# Patient Record
Sex: MALE | Race: WHITE | NOT HISPANIC OR LATINO | URBAN - METROPOLITAN AREA
[De-identification: names, ages, dates, MRNs, and addresses within clinical notes are randomized per-mention and may not be internally consistent; named-entity substitution may affect disease eponyms.]

---

## 2023-10-06 ENCOUNTER — APPOINTMENT (OUTPATIENT)
Dept: RADIOLOGY | Facility: HOSPITAL | Age: 28
End: 2023-10-06

## 2023-10-06 ENCOUNTER — HOSPITAL ENCOUNTER (EMERGENCY)
Facility: HOSPITAL | Age: 28
Discharge: HOME | End: 2023-10-06
Attending: STUDENT IN AN ORGANIZED HEALTH CARE EDUCATION/TRAINING PROGRAM

## 2023-10-06 VITALS
OXYGEN SATURATION: 99 % | DIASTOLIC BLOOD PRESSURE: 95 MMHG | WEIGHT: 230 LBS | SYSTOLIC BLOOD PRESSURE: 169 MMHG | HEART RATE: 114 BPM | TEMPERATURE: 97.5 F | HEIGHT: 70 IN | RESPIRATION RATE: 18 BRPM | BODY MASS INDEX: 32.93 KG/M2

## 2023-10-06 DIAGNOSIS — S69.90XA INJURY OF HAND, UNSPECIFIED LATERALITY, INITIAL ENCOUNTER: ICD-10-CM

## 2023-10-06 DIAGNOSIS — T14.8XXA PUNCTURE WOUND: Primary | ICD-10-CM

## 2023-10-06 PROCEDURE — 99283 EMERGENCY DEPT VISIT LOW MDM: CPT | Mod: 25 | Performed by: STUDENT IN AN ORGANIZED HEALTH CARE EDUCATION/TRAINING PROGRAM

## 2023-10-06 PROCEDURE — 73130 X-RAY EXAM OF HAND: CPT | Mod: LT,FY

## 2023-10-06 RX ORDER — CEPHALEXIN 500 MG/1
500 CAPSULE ORAL 4 TIMES DAILY
Qty: 40 CAPSULE | Refills: 0 | Status: SHIPPED | OUTPATIENT
Start: 2023-10-06 | End: 2023-10-16

## 2023-10-06 ASSESSMENT — PAIN SCALES - GENERAL: PAINLEVEL_OUTOF10: 7

## 2023-10-06 ASSESSMENT — COLUMBIA-SUICIDE SEVERITY RATING SCALE - C-SSRS
2. HAVE YOU ACTUALLY HAD ANY THOUGHTS OF KILLING YOURSELF?: NO
6. HAVE YOU EVER DONE ANYTHING, STARTED TO DO ANYTHING, OR PREPARED TO DO ANYTHING TO END YOUR LIFE?: NO
1. IN THE PAST MONTH, HAVE YOU WISHED YOU WERE DEAD OR WISHED YOU COULD GO TO SLEEP AND NOT WAKE UP?: NO

## 2023-10-06 ASSESSMENT — PAIN DESCRIPTION - FREQUENCY: FREQUENCY: CONSTANT/CONTINUOUS

## 2023-10-06 ASSESSMENT — PAIN DESCRIPTION - DESCRIPTORS: DESCRIPTORS: PRESSURE

## 2023-10-06 ASSESSMENT — LIFESTYLE VARIABLES
EVER HAD A DRINK FIRST THING IN THE MORNING TO STEADY YOUR NERVES TO GET RID OF A HANGOVER: NO
EVER FELT BAD OR GUILTY ABOUT YOUR DRINKING: NO
HAVE PEOPLE ANNOYED YOU BY CRITICIZING YOUR DRINKING: NO
HAVE YOU EVER FELT YOU SHOULD CUT DOWN ON YOUR DRINKING: NO

## 2023-10-06 ASSESSMENT — PAIN DESCRIPTION - LOCATION: LOCATION: HAND

## 2023-10-06 ASSESSMENT — PAIN DESCRIPTION - ORIENTATION: ORIENTATION: LEFT

## 2023-10-06 ASSESSMENT — PAIN - FUNCTIONAL ASSESSMENT: PAIN_FUNCTIONAL_ASSESSMENT: 0-10

## 2023-10-06 ASSESSMENT — PAIN DESCRIPTION - ONSET: ONSET: ONGOING

## 2023-10-06 ASSESSMENT — PAIN DESCRIPTION - PROGRESSION: CLINICAL_PROGRESSION: NOT CHANGED

## 2023-10-06 NOTE — Clinical Note
Rodri Gooden was seen and treated in our emergency department on 10/6/2023.  He may return to work on 10/09/2023.       If you have any questions or concerns, please don't hesitate to call.      Hussain Ayala MD

## 2023-10-06 NOTE — ED PROVIDER NOTES
Chief Complaint   Patient presents with   • Hand Injury     Pt presents to the emergency department with a hand injury. Pt put a screwdriver through his hand. Pt able to bend hand.            Chief Complaint: Puncture wound to the left hand.    History of Present Illness (HPI):  The patient is a 28-year-old male with no significant past medical history. He presents to the ED with complaints of a puncture wound to his left hand. The injury occurred while the patient was working with a screwdriver, which punctured the palmar aspect of the thenar region of his hand. Despite the injury, the patient reports full range of motion and pain with movement. He denies any numbness and there is no active bleeding at the site of the wound.    Past Medical History:    No significant past medical history.  Physical Exam:    The patient has a puncture wound on the palmar aspect of the thenar region of the left hand.  Full range of motion is maintained in the hand.  No signs of numbness or weakness are observed.  There is no active bleeding.  Assessment and Plan:  Given the patient's presentation with a puncture wound to the hand, the following steps will be taken:    Redosing of Tdap (Tetanus, Diphtheria, Pertussis) vaccine as a preventive measure for potential infection.  Extensive irrigation of the wound to reduce the risk of infection.  Reevaluation of the injury site for any signs of foreign body retention or deeper injury.  An X-ray will be obtained to assess for the presence of any foreign bodies or fractures.  After evaluation, anticipate discharge with appropriate wound care instructions and a prescription for prophylactic antibiotics if deemed necessary.  Follow-up:  The patient will be provided with wound care instructions and advised to follow up with his primary care provider if any signs of infection or complications develop.        ED Course as of 10/06/23 1206   Fri Oct 06, 2023   1153 No evidence of foreign body on  x-ray, patient has been irrigated copiously, [AD]      ED Course User Index  [AD] Hussain Ayala MD         Diagnoses as of 10/06/23 1206   Puncture wound   Injury of hand, unspecified laterality, initial encounter           Patient History   History reviewed. No pertinent past medical history.  Past Surgical History:   Procedure Laterality Date   • TONSILLECTOMY       No family history on file.  Social History     Tobacco Use   • Smoking status: Every Day     Packs/day: .5     Types: Cigarettes   • Smokeless tobacco: Never   Vaping Use   • Vaping Use: Never used   Substance Use Topics   • Alcohol use: Never   • Drug use: Never       Physical Exam   ED Triage Vitals [10/06/23 1116]   Temp Heart Rate Resp BP   36.4 °C (97.5 °F) (!) 114 18 (!) 169/95      SpO2 Temp src Heart Rate Source Patient Position   99 % -- -- --      BP Location FiO2 (%)     -- --                 XR hand left 3+ views    (Results Pending)   X-ray without foreign body.  Wound thoroughly irrigated and cleansed, neurologically neurovascularly intact.  Dressing placed.  Antibiotics initiated.  Given appropriate follow-up.  Released in good condition.  Can take anti-inflammatory medicine for pain control.    Luther Patrick PA-C                      Procedure  Procedures     Luther Patrick PA-C  10/06/23 1206

## 2025-06-30 ENCOUNTER — HOSPITAL ENCOUNTER (EMERGENCY)
Facility: HOSPITAL | Age: 30
Discharge: HOME | End: 2025-06-30
Payer: COMMERCIAL

## 2025-06-30 ENCOUNTER — APPOINTMENT (OUTPATIENT)
Dept: RADIOLOGY | Facility: HOSPITAL | Age: 30
End: 2025-06-30
Payer: COMMERCIAL

## 2025-06-30 ENCOUNTER — APPOINTMENT (OUTPATIENT)
Dept: URGENT CARE | Age: 30
End: 2025-06-30

## 2025-06-30 VITALS
SYSTOLIC BLOOD PRESSURE: 177 MMHG | BODY MASS INDEX: 31.53 KG/M2 | RESPIRATION RATE: 16 BRPM | WEIGHT: 220.24 LBS | HEIGHT: 70 IN | DIASTOLIC BLOOD PRESSURE: 120 MMHG | TEMPERATURE: 98.2 F | OXYGEN SATURATION: 96 % | HEART RATE: 83 BPM

## 2025-06-30 DIAGNOSIS — S30.1XXA ABDOMINAL WALL HEMATOMA, INITIAL ENCOUNTER: Primary | ICD-10-CM

## 2025-06-30 DIAGNOSIS — I10 HYPERTENSION, UNSPECIFIED TYPE: ICD-10-CM

## 2025-06-30 LAB
ALBUMIN SERPL BCP-MCNC: 5.2 G/DL (ref 3.4–5)
ALP SERPL-CCNC: 55 U/L (ref 33–120)
ALT SERPL W P-5'-P-CCNC: 56 U/L (ref 10–52)
ANION GAP SERPL CALCULATED.3IONS-SCNC: 13 MMOL/L (ref 10–20)
AST SERPL W P-5'-P-CCNC: 31 U/L (ref 9–39)
BASOPHILS # BLD AUTO: 0.03 X10*3/UL (ref 0–0.1)
BASOPHILS NFR BLD AUTO: 0.3 %
BILIRUB SERPL-MCNC: 0.8 MG/DL (ref 0–1.2)
BUN SERPL-MCNC: 12 MG/DL (ref 6–23)
CALCIUM SERPL-MCNC: 10.1 MG/DL (ref 8.6–10.3)
CHLORIDE SERPL-SCNC: 104 MMOL/L (ref 98–107)
CO2 SERPL-SCNC: 26 MMOL/L (ref 21–32)
CREAT SERPL-MCNC: 0.97 MG/DL (ref 0.5–1.3)
EGFRCR SERPLBLD CKD-EPI 2021: >90 ML/MIN/1.73M*2
EOSINOPHIL # BLD AUTO: 0.01 X10*3/UL (ref 0–0.7)
EOSINOPHIL NFR BLD AUTO: 0.1 %
ERYTHROCYTE [DISTWIDTH] IN BLOOD BY AUTOMATED COUNT: 12.1 % (ref 11.5–14.5)
GLUCOSE SERPL-MCNC: 95 MG/DL (ref 74–99)
HCT VFR BLD AUTO: 47.6 % (ref 41–52)
HGB BLD-MCNC: 16.7 G/DL (ref 13.5–17.5)
IMM GRANULOCYTES # BLD AUTO: 0.03 X10*3/UL (ref 0–0.7)
IMM GRANULOCYTES NFR BLD AUTO: 0.3 % (ref 0–0.9)
LYMPHOCYTES # BLD AUTO: 2.31 X10*3/UL (ref 1.2–4.8)
LYMPHOCYTES NFR BLD AUTO: 25.2 %
MCH RBC QN AUTO: 31.6 PG (ref 26–34)
MCHC RBC AUTO-ENTMCNC: 35.1 G/DL (ref 32–36)
MCV RBC AUTO: 90 FL (ref 80–100)
MONOCYTES # BLD AUTO: 0.49 X10*3/UL (ref 0.1–1)
MONOCYTES NFR BLD AUTO: 5.3 %
NEUTROPHILS # BLD AUTO: 6.3 X10*3/UL (ref 1.2–7.7)
NEUTROPHILS NFR BLD AUTO: 68.8 %
NRBC BLD-RTO: 0 /100 WBCS (ref 0–0)
PLATELET # BLD AUTO: 396 X10*3/UL (ref 150–450)
POTASSIUM SERPL-SCNC: 4 MMOL/L (ref 3.5–5.3)
PROT SERPL-MCNC: 8.3 G/DL (ref 6.4–8.2)
RBC # BLD AUTO: 5.29 X10*6/UL (ref 4.5–5.9)
SODIUM SERPL-SCNC: 139 MMOL/L (ref 136–145)
WBC # BLD AUTO: 9.2 X10*3/UL (ref 4.4–11.3)

## 2025-06-30 PROCEDURE — 2500000005 HC RX 250 GENERAL PHARMACY W/O HCPCS: Performed by: PHYSICIAN ASSISTANT

## 2025-06-30 PROCEDURE — 2500000001 HC RX 250 WO HCPCS SELF ADMINISTERED DRUGS (ALT 637 FOR MEDICARE OP): Performed by: PHYSICIAN ASSISTANT

## 2025-06-30 PROCEDURE — 85025 COMPLETE CBC W/AUTO DIFF WBC: CPT | Performed by: PHYSICIAN ASSISTANT

## 2025-06-30 PROCEDURE — 99285 EMERGENCY DEPT VISIT HI MDM: CPT | Mod: 25

## 2025-06-30 PROCEDURE — 2500000004 HC RX 250 GENERAL PHARMACY W/ HCPCS (ALT 636 FOR OP/ED): Performed by: PHYSICIAN ASSISTANT

## 2025-06-30 PROCEDURE — 36415 COLL VENOUS BLD VENIPUNCTURE: CPT | Performed by: PHYSICIAN ASSISTANT

## 2025-06-30 PROCEDURE — 2550000001 HC RX 255 CONTRASTS: Performed by: PHYSICIAN ASSISTANT

## 2025-06-30 PROCEDURE — 74177 CT ABD & PELVIS W/CONTRAST: CPT

## 2025-06-30 PROCEDURE — 96360 HYDRATION IV INFUSION INIT: CPT | Mod: 59

## 2025-06-30 PROCEDURE — 74177 CT ABD & PELVIS W/CONTRAST: CPT | Performed by: RADIOLOGY

## 2025-06-30 PROCEDURE — 84075 ASSAY ALKALINE PHOSPHATASE: CPT | Performed by: PHYSICIAN ASSISTANT

## 2025-06-30 RX ORDER — ACETAMINOPHEN 325 MG/1
975 TABLET ORAL ONCE
Status: COMPLETED | OUTPATIENT
Start: 2025-06-30 | End: 2025-06-30

## 2025-06-30 RX ORDER — LIDOCAINE 560 MG/1
1 PATCH PERCUTANEOUS; TOPICAL; TRANSDERMAL ONCE
Status: DISCONTINUED | OUTPATIENT
Start: 2025-06-30 | End: 2025-06-30 | Stop reason: HOSPADM

## 2025-06-30 RX ADMIN — ACETAMINOPHEN 975 MG: 325 TABLET ORAL at 12:01

## 2025-06-30 RX ADMIN — SODIUM CHLORIDE 1000 ML: 900 INJECTION, SOLUTION INTRAVENOUS at 12:09

## 2025-06-30 RX ADMIN — IOHEXOL 75 ML: 350 INJECTION, SOLUTION INTRAVENOUS at 12:52

## 2025-06-30 RX ADMIN — LIDOCAINE 4% 1 PATCH: 40 PATCH TOPICAL at 12:01

## 2025-06-30 ASSESSMENT — PAIN - FUNCTIONAL ASSESSMENT
PAIN_FUNCTIONAL_ASSESSMENT: 0-10
PAIN_FUNCTIONAL_ASSESSMENT: 0-10

## 2025-06-30 ASSESSMENT — PAIN SCALES - GENERAL
PAINLEVEL_OUTOF10: 4
PAINLEVEL_OUTOF10: 10 - WORST POSSIBLE PAIN

## 2025-06-30 NOTE — LETTER
June 30, 2025    Patient: Rodri Gooden   YOB: 1995   Date of Visit: 6/30/2025       To Whom It May Concern:    Rodri Gooden was seen and treated in our emergency department on 6/30/2025. He may be excused for work 6-30-25 to 7-01-25.    If you have any questions or concerns, please don't hesitate to call.

## 2025-06-30 NOTE — ED PROVIDER NOTES
HPI   Chief Complaint   Patient presents with    Abdominal Injury     Patient stated that on the 21st you accidentally hit stomach with his , now he is having pain where he hit himself with the .        HPI  29-year-old male here for evaluation of right lower abdominal pain.  Says that he has a large commercial lawnmower that he stands on a roller behind it and it pulls him.  The mower was moving and it clipped a cement piece on something and came to a quick stop and the handle struck him in the right lower quadrant.      Patient History   Medical History[1]  Surgical History[2]  Family History[3]  Social History[4]    Physical Exam   ED Triage Vitals [06/30/25 1138]   Temperature Heart Rate Respirations BP   36.8 °C (98.2 °F) 83 16 (!) 177/120      Pulse Ox Temp Source Heart Rate Source Patient Position   96 % Oral Monitor Sitting      BP Location FiO2 (%)     Right arm --       Physical Exam  PHYSICAL EXAMINATION    GENERAL APPEARANCE: Awake and alert.     VITAL SIGNS: As per the nurses' triage record.     HEENT: Normocephalic, atraumatic. Extraocular muscles are intact. Pupils equal round and reactive to light. Conjunctiva are pink. Negative scleral icterus. Mucous membranes are moist. Tongue in the midline. Pharynx was without erythema or exudates, uvula midline    NECK: Soft Nontender and supple, full gross ROM, no meningeal signs.    CHEST: Nontender to palpation. Clear to auscultation bilaterally. No rales, rhonchi, or wheezing.     HEART: S1, S2. Regular rate and rhythm. No murmurs, gallops or rubs.  Strong and equal pulses in the extremities.     ABDOMEN: Soft, palpable hematoma right lower quadrant, approximately 4 x 5 cm.  No redness or skin breakdown, nondistended, positive bowel sounds, no palpable masses.    MUSCULOSKELETAL: The calves are nontender to palpation. Full gross active range of motion. Ambulating on own with no acute difficulties     NEUROLOGICAL: Awake, alert and  oriented x 3. Power intact in the upper and lower extremities. Sensation is intact to light touch in the upper and lower extremities.     IMMUNOLOGICAL: No lymphatic streaking noted     DERM: No petechiae, rashes, or ecchymoses.    ED Course & MDM   Diagnoses as of 06/30/25 1336   Abdominal wall hematoma, initial encounter   Hypertension, unspecified type                 No data recorded     [place] Coma Scale Score: 15 (06/30/25 1203 : Ana Maria Matamoros RN)                           Medical Decision Making  Parts of this chart have been completed using voice recognition software. Please excuse any errors of transcription.  My thought process and reason for plan has been formulated from the time that I saw the patient until the time of disposition and is not specific to one specific moment during their visit and furthermore my MDM encompasses this entire chart and not only this text box.      HPI: Detailed above.    Exam: A medically appropriate exam performed, outlined above, given the known history and presentation.    History Limited by: Nothing    History obtained from: The patient    External/internal records reviewed: No external record reviewed    Social Determinants of Health considered during this visit: Lives at home    Chronic conditions impacting care: Denies    Medications given during visit:  Medications   lidocaine 4 % patch 1 patch (1 patch transdermal Medication Applied 6/30/25 1201)   sodium chloride 0.9 % bolus 1,000 mL (0 mL intravenous Stopped 6/30/25 1251)   acetaminophen (Tylenol) tablet 975 mg (975 mg oral Given 6/30/25 1201)   iohexol (OMNIPaque) 350 mg iodine/mL solution 75 mL (75 mL intravenous Given 6/30/25 1252)        Diagnostic/tests  Labs Reviewed   COMPREHENSIVE METABOLIC PANEL - Abnormal       Result Value    Glucose 95      Sodium 139      Potassium 4.0      Chloride 104      Bicarbonate 26      Anion Gap 13      Urea Nitrogen 12      Creatinine 0.97      eGFR >90      Calcium 10.1       Albumin 5.2 (*)     Alkaline Phosphatase 55      Total Protein 8.3 (*)     AST 31      Bilirubin, Total 0.8      ALT 56 (*)    CBC WITH AUTO DIFFERENTIAL    WBC 9.2      nRBC 0.0      RBC 5.29      Hemoglobin 16.7      Hematocrit 47.6      MCV 90      MCH 31.6      MCHC 35.1      RDW 12.1      Platelets 396      Neutrophils % 68.8      Immature Granulocytes %, Automated 0.3      Lymphocytes % 25.2      Monocytes % 5.3      Eosinophils % 0.1      Basophils % 0.3      Neutrophils Absolute 6.30      Immature Granulocytes Absolute, Automated 0.03      Lymphocytes Absolute 2.31      Monocytes Absolute 0.49      Eosinophils Absolute 0.01      Basophils Absolute 0.03        CT abdomen pelvis w IV contrast   Final Result   1. No evidence of bowel obstruction, free intraperitoneal air or   abnormal intra-abdominal fluid collection.        MACRO:   None        Signed by: Eliot Mohan 6/30/2025 1:18 PM   Dictation workstation:   [id number]               Considerations/further MDM:  I estimate there is low risk for acute abdomen.  I have considered the following: acute appendicitis, bowel obstruction, cholecystitis, diverticulitis, incarcerated hernia, mesenteric ischemia, pancreatitis, acute liver failure, renal failure, UTI/Pyelonephritis to an extent that requires admission and IV abx, perforated bowel, or ulcers.    Thus I consider the discharge disposition reasonable. Also, there is no evidence or peritonitis, sepsis, or toxicity. We have discussed the diagnosis and risks, and we agree with discharging home to follow-up with appropriate physician as directed. We also discussed returning to the Emergency Department immediately if new or worsening symptoms occur. We have discussed the symptoms which are most concerning (e.g., bloody stool, fever, changing or worsening pain, nausea, vomiting) that necessitate immediate return. Pt symptoms have been well controlled here with medications and the patient is lower risk for  acute/surgical abdomen and is safe for discharge with appropriate outpatient follow up.  The patient has verbalized understanding to return to ER without delay for new or worsening pains or for any other symptoms or concerns.    Hypertension initially appreciated improved.    Recommend ice and conservative management, vitals are reviewed, hemoglobin stable.      Procedure  Procedures       [1] No past medical history on file.  [2]   Past Surgical History:  Procedure Laterality Date    TONSILLECTOMY     [3] No family history on file.  [4]   Social History  Tobacco Use    Smoking status: Every Day     Current packs/day: 0.50     Types: Cigarettes    Smokeless tobacco: Never   Vaping Use    Vaping status: Never Used   Substance Use Topics    Alcohol use: Never    Drug use: Never        Tate Dumont PA-C  06/30/25 2651

## 2025-06-30 NOTE — DISCHARGE INSTRUCTIONS
"Thank you for allowing us to take care of you today. While you are home, you might receive a survey about your care in our hospital. Your nurse and myself, Tate Dumont PA-C, would love your honest feedback on how we can improve your care. Your feedback and especially your positive comments help our hospital receive the support we need to continue to serve you and your family. Thank you again for trusting us with your care.\"    Continue applying ice to affected area, if it starts to expand or enlarged return immediately without delay to the nearest emergency department or call 911    Be sure to follow up as directed in 1-2 days.  All of the details of your follow up instructions are detailed in the follow up section of this packet.         It is important to remember that your care does not end here and you must continue to monitor your condition closely. Please return to the emergency department for any worsening or concerning signs or symptoms as directed by our conversations and the discharge instructions. Otherwise please follow up with your doctor in 2 days if no better or worse. If you do not have a doctor please contact the referral number on your discharge instructions. Please contact any physician specialists provided in your discharge notes as it is very important to follow up with them regarding your condition. If you are unable to reach the physicians provided, please come back to the Emergency Department at any time.        Return to emergency room without delay for ANY new or worsening pains or for any other symptoms or concerns.      "

## 2025-07-22 ENCOUNTER — APPOINTMENT (OUTPATIENT)
Dept: PRIMARY CARE | Facility: CLINIC | Age: 30
End: 2025-07-22
Payer: COMMERCIAL